# Patient Record
Sex: FEMALE
[De-identification: names, ages, dates, MRNs, and addresses within clinical notes are randomized per-mention and may not be internally consistent; named-entity substitution may affect disease eponyms.]

---

## 2017-12-18 ENCOUNTER — HOSPITAL ENCOUNTER (EMERGENCY)
Dept: HOSPITAL 88 - ER | Age: 22
Discharge: HOME | End: 2017-12-18
Payer: SELF-PAY

## 2017-12-18 VITALS — WEIGHT: 270 LBS | BODY MASS INDEX: 53.01 KG/M2 | HEIGHT: 60 IN

## 2017-12-18 DIAGNOSIS — L05.01: Primary | ICD-10-CM

## 2017-12-18 PROCEDURE — 87071 CULTURE AEROBIC QUANT OTHER: CPT

## 2017-12-18 PROCEDURE — 99283 EMERGENCY DEPT VISIT LOW MDM: CPT

## 2017-12-18 PROCEDURE — 87205 SMEAR GRAM STAIN: CPT

## 2020-05-23 ENCOUNTER — HOSPITAL ENCOUNTER (EMERGENCY)
Dept: HOSPITAL 88 - ER | Age: 25
Discharge: HOME | End: 2020-05-23
Payer: SELF-PAY

## 2020-05-23 VITALS — SYSTOLIC BLOOD PRESSURE: 152 MMHG | DIASTOLIC BLOOD PRESSURE: 88 MMHG

## 2020-05-23 VITALS — WEIGHT: 270 LBS | HEIGHT: 64 IN | BODY MASS INDEX: 46.1 KG/M2

## 2020-05-23 DIAGNOSIS — R11.2: ICD-10-CM

## 2020-05-23 DIAGNOSIS — K80.20: ICD-10-CM

## 2020-05-23 DIAGNOSIS — R10.13: ICD-10-CM

## 2020-05-23 DIAGNOSIS — N39.0: ICD-10-CM

## 2020-05-23 DIAGNOSIS — M54.5: Primary | ICD-10-CM

## 2020-05-23 LAB
ALBUMIN SERPL-MCNC: 3.6 G/DL (ref 3.5–5)
ALBUMIN/GLOB SERPL: 1 {RATIO} (ref 0.8–2)
ALP SERPL-CCNC: 90 IU/L (ref 40–150)
ALT SERPL-CCNC: 18 IU/L (ref 0–55)
ANION GAP SERPL CALC-SCNC: 14.8 MMOL/L (ref 8–16)
BACTERIA URNS QL MICRO: (no result) /HPF
BASOPHILS # BLD AUTO: 0 10*3/UL (ref 0–0.1)
BASOPHILS NFR BLD AUTO: 0.3 % (ref 0–1)
BILIRUB UR QL: (no result)
BUN SERPL-MCNC: 6 MG/DL (ref 7–26)
BUN/CREAT SERPL: 9 (ref 6–25)
CALCIUM SERPL-MCNC: 9.1 MG/DL (ref 8.4–10.2)
CHLORIDE SERPL-SCNC: 102 MMOL/L (ref 98–107)
CLARITY UR: (no result)
CO2 SERPL-SCNC: 23 MMOL/L (ref 22–29)
COLOR UR: YELLOW
DEPRECATED NEUTROPHILS # BLD AUTO: 11 10*3/UL (ref 2.1–6.9)
DEPRECATED RBC URNS MANUAL-ACNC: (no result) /HPF (ref 0–5)
EGFRCR SERPLBLD CKD-EPI 2021: > 60 ML/MIN (ref 60–?)
EOSINOPHIL # BLD AUTO: 0.2 10*3/UL (ref 0–0.4)
EOSINOPHIL NFR BLD AUTO: 1.5 % (ref 0–6)
EPI CELLS URNS QL MICRO: (no result) /LPF
ERYTHROCYTE [DISTWIDTH] IN CORD BLOOD: 13.9 % (ref 11.7–14.4)
GLOBULIN PLAS-MCNC: 3.7 G/DL (ref 2.3–3.5)
GLUCOSE SERPLBLD-MCNC: 98 MG/DL (ref 74–118)
HCG UR QL: NEGATIVE
HCT VFR BLD AUTO: 36.4 % (ref 34.2–44.1)
HGB BLD-MCNC: 12.2 G/DL (ref 12–16)
KETONES UR QL STRIP.AUTO: NEGATIVE
LEUKOCYTE ESTERASE UR QL STRIP.AUTO: (no result)
LYMPHOCYTES # BLD: 2.1 10*3/UL (ref 1–3.2)
LYMPHOCYTES NFR BLD AUTO: 15 % (ref 18–39.1)
MCH RBC QN AUTO: 28.6 PG (ref 28–32)
MCHC RBC AUTO-ENTMCNC: 33.5 G/DL (ref 31–35)
MCV RBC AUTO: 85.2 FL (ref 81–99)
MONOCYTES # BLD AUTO: 0.7 10*3/UL (ref 0.2–0.8)
MONOCYTES NFR BLD AUTO: 4.9 % (ref 4.4–11.3)
NEUTS SEG NFR BLD AUTO: 77.7 % (ref 38.7–80)
NITRITE UR QL STRIP.AUTO: NEGATIVE
PLATELET # BLD AUTO: 264 X10E3/UL (ref 140–360)
POTASSIUM SERPL-SCNC: 3.8 MMOL/L (ref 3.5–5.1)
PROT UR QL STRIP.AUTO: (no result)
RBC # BLD AUTO: 4.27 X10E6/UL (ref 3.6–5.1)
SODIUM SERPL-SCNC: 136 MMOL/L (ref 136–145)
SP GR UR STRIP: 1.02 (ref 1.01–1.02)
UROBILINOGEN UR STRIP-MCNC: 1 MG/DL (ref 0.2–1)
WBC #/AREA URNS HPF: >50 /HPF (ref 0–5)

## 2020-05-23 PROCEDURE — 81025 URINE PREGNANCY TEST: CPT

## 2020-05-23 PROCEDURE — 83690 ASSAY OF LIPASE: CPT

## 2020-05-23 PROCEDURE — 81001 URINALYSIS AUTO W/SCOPE: CPT

## 2020-05-23 PROCEDURE — 80053 COMPREHEN METABOLIC PANEL: CPT

## 2020-05-23 PROCEDURE — 74176 CT ABD & PELVIS W/O CONTRAST: CPT

## 2020-05-23 PROCEDURE — 99284 EMERGENCY DEPT VISIT MOD MDM: CPT

## 2020-05-23 PROCEDURE — 85025 COMPLETE CBC W/AUTO DIFF WBC: CPT

## 2020-05-23 PROCEDURE — 36415 COLL VENOUS BLD VENIPUNCTURE: CPT

## 2020-05-23 NOTE — EMERGENCY DEPARTMENT NOTE
History of Present Illnes


History of Present Illness


Chief Complaint:  General Medicine Complaints


History of Present Illness


This is a 24 year old  female  b/l flank pain since 2 days prior. Denies

f/v with (+) Nausea.


 Required:  No


Onset (how long ago):  day(s)


Location:  epigastric region and b/l Flank


Quality:  aching


Radiation:  abdomen


Severity:  moderate


Onset quality:  gradual


Duration (how long):  day(s) (2)


Timing of current episode:  intermittent


Progression:  worsening


Chronicity:  new


Relieving factors:  none


Exacerbating factors:  none


Associated symptoms:  nausea/vomiting


Treatments prior to arrival:  none


Risk factors:  morbid obesity





Past Medical/Family History


Physician Review


I have reviewed the patient's past medical and family history.  Any updates have

been documented here.





Past Medical History


Recent Fever:  No


Clinical Suspicion of Infectio:  No


New/Unexplained Change in Ment:  No


Past Medical History:  None


Past Surgical History:  None





Social History


Smoking Cessation:  Never Smoker


Alcohol Use:  None


Any Illegal Drug Use:  No





Other


Last Tetanus:  ood





Review of Systems


Review of Systems


Constitutional:  no symptoms


EENTM:  no symptoms


Cardiovascular:  no symptoms


Respiratory:  no symptoms


Gastrointestinal:  abdominal pain, nausea


Genitourinary:  no symptoms


Musculoskeletal:  no symptoms


Neurological:  no symptoms


Psychological:  no symptoms


Endocrine:  no symptoms


Hematological/Lymphatic:  no symptoms


Review of other systems


All other systems reviewed and negative.





Physical Exam


Related Data


Allergies:  


Coded Allergies:  


     No Known Allergies (Unverified , 17)





Physical Exam


CONSTITUTIONAL





Constitutional:  obese


HENT


HENT:  normocephalic, atraumatic, oropharynx clear/moist, nose normal


HENT L/R:  left ext ear normal, right ext ear normal


EYES





Eyes:  PERRL, conjunctivae normal


NECK


Neck:  ROM normal


PULMONARY


Pulmonary:  effort normal, breath sounds normal


CARDIOVASCULAR





Cardiovascular:  regular rhythm, heart sounds normal, capillary refill normal, 

normal rate


GASTROINTESTINAL





Abdominal:  soft, tender (epigastric , and b/l flank)


GENITOURINARY





Genitourinary:  exam deferred


SKIN


Skin:  warm, dry


MUSCULOSKELETAL





Musculoskeletal:  ROM normal


NEUROLOGICAL





Neurological:  alert, oriented x 3, no gross motor or sensory deficits


PSYCHOLOGICAL


Psychological:  mood/affect normal, judgement normal





Results


Laboratory


Lab results reviewed:  Yes


Laboratory comments





Laboratory Tests








Test


 20


03:32 20


01:00


 


White Blood Count


 14.18 x10e3/uL


(4.8-10.8) 





 


Red Blood Count


 4.27 x10e6/uL


(3.6-5.1) 





 


Hemoglobin


 12.2 g/dL


(12.0-16.0) 





 


Hematocrit


 36.4 %


(34.2-44.1) 





 


Mean Corpuscular Volume


 85.2 fL


(81-99) 





 


Mean Corpuscular Hemoglobin


 28.6 pg


(28-32) 





 


Mean Corpuscular Hemoglobin


Concent 33.5 g/dL


(31-35) 





 


Red Cell Distribution Width


 13.9 %


(11.7-14.4) 





 


Platelet Count


 264 x10e3/uL


(140-360) 





 


Neutrophils (%) (Auto)


 77.7 %


(38.7-80.0) 





 


Lymphocytes (%) (Auto)


 15.0 %


(18.0-39.1) 





 


Monocytes (%) (Auto)


 4.9  %


(4.4-11.3) 





 


Eosinophils (%) (Auto)


 1.5 %


(0.0-6.0) 





 


Basophils (%) (Auto)


 0.3 %


(0.0-1.0) 





 


Neutrophils # (Auto) 11.0 (2.1-6.9)  


 


Lymphocytes # (Auto) 2.1 (1.0-3.2)  


 


Monocytes # (Auto) 0.7 (0.2-0.8)  


 


Eosinophils # (Auto) 0.2 (0.0-0.4)  


 


Basophils # (Auto) 0.0 (0.0-0.1)  


 


Absolute Immature Granulocyte


(auto 0.09 x10e3/uL


(0-0.1) 





 


Sodium Level


 136 mmol/L


(136-145) 





 


Potassium Level


 3.8 mmol/L


(3.5-5.1) 





 


Chloride Level


 102 mmol/L


() 





 


Carbon Dioxide Level


 23 mmol/L


(22-29) 





 


Anion Gap


 14.8 mmol/L


(8-16) 





 


Blood Urea Nitrogen 6 mg/dL (7-26)  


 


Creatinine


 0.65 mg/dL


(0.57-1.11) 





 


Estimat Glomerular Filtration


Rate > 60 ML/MIN


(60-) 





 


BUN/Creatinine Ratio 9 (6-25)  


 


Glucose Level


 98 mg/dL


() 





 


Calcium Level


 9.1 mg/dL


(8.4-10.2) 





 


Total Bilirubin


 0.8 mg/dL


(0.2-1.2) 





 


Aspartate Amino Transf


(AST/SGOT) 12 IU/L (5-34) 


 





 


Alanine Aminotransferase


(ALT/SGPT) 18 IU/L (0-55) 


 





 


Alkaline Phosphatase


 90 IU/L


() 





 


Total Protein


 7.3 g/dL


(6.5-8.1) 





 


Albumin


 3.6 g/dL


(3.5-5.0) 





 


Globulin


 3.7 g/dL


(2.3-3.5) 





 


Albumin/Globulin Ratio 1.0 (0.8-2.0)  


 


Lipase 10 U/L (8-78)  


 


Urine Color


 


 Yellow


(YELLOW)


 


Urine Clarity  Cloudy (CLEAR) 


 


Urine pH  6 (5 - 7) 


 


Urine Specific Gravity


 


 1.025


(1.010-1.025)


 


Urine Protein  1+ (NEGATIVE) 


 


Urine Glucose (UA)


 


 Negative


(NEGATIVE)


 


Urine Ketones


 


 Negative


(NEGATIVE)


 


Urine Blood


 


 Trace


(NEGATIVE)


 


Urine Nitrite


 


 Negative


(NEGATIVE)


 


Urine Bilirubin


 


 Small


(NEGATIVE)


 


Urine Urobilinogen


 


 1 mg/dL (0.2 -


1)


 


Urine Leukocyte Esterase


 


 Small


(NEGATIVE)


 


Urine RBC


 


 6-10 /HPF


(0-5)


 


Urine WBC  >50 /HPF (0-5) 


 


Urine Epithelial Cells


 


 Many /LPF


(NONE)


 


Urine Bacteria


 


 Many /HPF


(NONE)


 


Urine Pregnancy Test


 


 Negative


(NEGATIVE)











Imaging


Imaging results reviewed:  Yes


Impressions


                                        


                        [place] [id number]








Patient Name: CLIFF FERNANDES                          MR #: [id number]


8             


: 1995                         Age/Sex: 24/F


Acct #: [id number]                    Req #: [id number]


Adm Physician:                                      


Ordered by: BHAVYA ROSS DO                    Report #: [id number] 


Location: ER                            Room/Bed:           


                                                                                


________________________________________________________________________________


___________________





Procedure:  CT/CT ABDOMEN/PELVIS WO


Exam Date: 20                            Exam Time: 215








                              REPORT STATUS: Signed





EXAM: CT Abdomen and Pelvis WITHOUT contrast  





INDICATION: Epigastric and bilateral flank pain.





COMPARISON: None.





TECHNIQUE: Abdomen and pelvis were scanned utilizing a multidetector helical


scanner from the lung base to the pubic symphysis without administration of IV


contrast. Absence of intravenous contrast decreases sensitivity for detection


of focal lesions and vascular pathology. Coronal and sagittal reformations were


obtained. Routine protocol was performed. 


            IV CONTRAST: None.


            ORAL CONTRAST: None


            RADIATION DOSE: Total DLP: 835 mGy*cm


             Estimated effective dose: (DLP x 0.015 x size factor) mSv


            COMPLICATIONS: None





FINDINGS:





LINES and TUBES: None.





LOWER THORAX:  Unremarkable





HEPATOBILIARY:      No focal hepatic lesions. No biliary ductal dilation. 





GALLBLADDER: Distended gallbladder with surrounding inflammatory changes.


Possible cholelithiasis.





SPLEEN: No splenomegaly. 





PANCREAS: No focal masses or ductal dilatation.  





ADRENALS: Indeterminant 2.1 cm left adrenal nodule (series 3, image 57; 34 HU).








KIDNEYS/URETERS:  No hydronephrosis. No cystic or solid mass lesions.  No


stones.





GI TRACT: No abnormal distention, wall thickening, or evidence of bowel


obstruction.       Appendix is normal.





PELVIC ORGANS/BLADDER: Unremarkable.





LYMPH NODES: No lymphadenopathy.





VESSELS: Unremarkable.





PERITONEUM / RETROPERITONEUM: No free air or fluid.





BONES: Unremarkable.





SOFT TISSUES: Unremarkable.            





IMPRESSION: 


Findings suggestive of acute calculous cholecystitis. Recommend right upper


quadrant ultrasound evaluation.





No evidence of nephrolithiasis.





Indeterminant 2.1 cm left adrenal nodule, for which a follow-up nonemergent CT


or MRI is admitted for further evaluation.





Signed by: Dr. Dora Jose MD on 2020 3:14 AM








Dictated By: DORA JOSE MD


Electronically Signed By: DORA JOSE MD on 20


Transcribed By: ARASH on 20 








COPY TO:   BHAVYA ROSS DO~





Assessment & Plan


Assessment & Plan


Final Impression:  


(1) Cholelithiasis


(2) UTI (urinary tract infection)


Assessment & Plan


Labs and CTS reviewed with patient. Liver enzymes and Alk Phos wnl. Patient to 

bed discharged to home with F/U with Dr KEREN Macias. Rx cipro, flagyl, tylenol #3, and

zofran.


Depart Disposition:  HOME, SELF-CARE


Last Vital Signs











  Date Time  Temp Pulse Resp B/P (MAP) Pulse Ox O2 Delivery O2 Flow Rate FiO2


 


20 04:33  70 20  99   


 


20 02:43    152/97    


 


20 00:50 99.5       








Medications in the ED





Sodium Chloride 1,000 ml @  0 mls/hr Q0M STAT IV  Last administered on 20at

03:33; Admin Dose 999 MLS/HR;  Start 20 at 03:23;  Stop 20 at 03:24;  

Status DC


Sodium Chloride 1,000 ml @  ud STK-MED ONCE .ROUTE ;  Start 20 at 03:29;  

Stop 20 at 03:24;  Status DC


Morphine Sulfate 4 mg ONCE  STAT IV  Last administered on 20at 03:57; Admin

Dose 4 MG;  Start 20 at 03:43;  Stop 20 at 03:48;  Status DC


Ondansetron HCl 4 mg NOW  STAT IV  Last administered on 20at 03:58; Admin 

Dose 4 MG;  Start 20 at 03:43;  Stop 20 at 03:48;  Status DC


Morphine Sulfate 4 mg ONCE  STAT IV ;  Start 20 at 04:26;  Stop 20 at 

04:32;  Status DC











BHAVYA ROSS DO                May 23, 2020 01:00

## 2020-05-23 NOTE — DIAGNOSTIC IMAGING REPORT
EXAM: CT Abdomen and Pelvis WITHOUT contrast  



INDICATION: Epigastric and bilateral flank pain.



COMPARISON: None.



TECHNIQUE: Abdomen and pelvis were scanned utilizing a multidetector helical

scanner from the lung base to the pubic symphysis without administration of IV

contrast. Absence of intravenous contrast decreases sensitivity for detection

of focal lesions and vascular pathology. Coronal and sagittal reformations were

obtained. Routine protocol was performed. 

            IV CONTRAST: None.

            ORAL CONTRAST: None

            RADIATION DOSE: Total DLP: 835 mGy*cm

             Estimated effective dose: (DLP x 0.015 x size factor) mSv

            COMPLICATIONS: None



FINDINGS:



LINES and TUBES: None.



LOWER THORAX:  Unremarkable



HEPATOBILIARY:      No focal hepatic lesions. No biliary ductal dilation. 



GALLBLADDER: Distended gallbladder with surrounding inflammatory changes.

Possible cholelithiasis.



SPLEEN: No splenomegaly. 



PANCREAS: No focal masses or ductal dilatation.  



ADRENALS: Indeterminant 2.1 cm left adrenal nodule (series 3, image 57; 34 HU).





KIDNEYS/URETERS:  No hydronephrosis. No cystic or solid mass lesions.  No

stones.



GI TRACT: No abnormal distention, wall thickening, or evidence of bowel

obstruction.       Appendix is normal.



PELVIC ORGANS/BLADDER: Unremarkable.



LYMPH NODES: No lymphadenopathy.



VESSELS: Unremarkable.



PERITONEUM / RETROPERITONEUM: No free air or fluid.



BONES: Unremarkable.



SOFT TISSUES: Unremarkable.            



IMPRESSION: 

Findings suggestive of acute calculous cholecystitis. Recommend right upper

quadrant ultrasound evaluation.



No evidence of nephrolithiasis.



Indeterminant 2.1 cm left adrenal nodule, for which a follow-up nonemergent CT

or MRI is admitted for further evaluation.



Signed by: Dr. Oli Anthony MD on 5/23/2020 3:14 AM